# Patient Record
Sex: FEMALE | Race: BLACK OR AFRICAN AMERICAN | NOT HISPANIC OR LATINO | Employment: OTHER | URBAN - METROPOLITAN AREA
[De-identification: names, ages, dates, MRNs, and addresses within clinical notes are randomized per-mention and may not be internally consistent; named-entity substitution may affect disease eponyms.]

---

## 2023-02-23 ENCOUNTER — HOSPITAL ENCOUNTER (OUTPATIENT)
Dept: TELEMEDICINE | Facility: HOSPITAL | Age: 63
Discharge: HOME | End: 2023-02-23

## 2023-02-23 DIAGNOSIS — G93.40 ACUTE ENCEPHALOPATHY: ICD-10-CM

## 2023-02-23 PROCEDURE — G0426 INPT/ED TELECONSULT50: HCPCS | Mod: GT,,, | Performed by: STUDENT IN AN ORGANIZED HEALTH CARE EDUCATION/TRAINING PROGRAM

## 2023-02-23 PROCEDURE — G0426 PR INPT TELEHEALTH CONSULT 50M: ICD-10-PCS | Mod: GT,,, | Performed by: STUDENT IN AN ORGANIZED HEALTH CARE EDUCATION/TRAINING PROGRAM

## 2023-02-23 NOTE — SUBJECTIVE & OBJECTIVE
Woke up with symptoms?: yes    Recent bleeding noted: no  Does the patient take any Blood Thinners? no  Medications: No relevant medications      Past Medical History: hypertension, hyperlipidemia, MI/CAD and stroke    Past Surgical History: no relevant surgical history    Family History: no relevant history    Social History: no smoking, no drinking, no drugs    Allergies: Allergies have not been reviewed No relevant allergies    Review of Systems   Constitutional: Positive for activity change and fatigue.   HENT: Positive for voice change. Negative for trouble swallowing.    Eyes: Positive for visual disturbance.   Respiratory: Negative for shortness of breath.    Gastrointestinal: Negative for nausea and vomiting.   Endocrine: Negative.    Genitourinary: Negative.    Musculoskeletal: Positive for gait problem.   Skin: Negative.    Neurological: Positive for speech difficulty, weakness (generalized, RUE>LUE) and numbness. Negative for dizziness, facial asymmetry and headaches.   Psychiatric/Behavioral: Positive for behavioral problems, confusion and decreased concentration.     Objective:   Vitals: There were no vitals taken for this visit.     HR 72  /98  CT READ: Yes  No hemmorhage. No mass effect. No early infarct signs.        Physical Exam  Constitutional:       Appearance: She is ill-appearing and toxic-appearing.      Comments: Thin, poor cooperation   HENT:      Head: Normocephalic and atraumatic.   Eyes:      Extraocular Movements: Extraocular movements intact.   Cardiovascular:      Rate and Rhythm: Normal rate.   Pulmonary:      Effort: Pulmonary effort is normal.   Abdominal:      General: Abdomen is flat.   Musculoskeletal:      Cervical back: Normal range of motion.   Neurological:      Cranial Nerves: No cranial nerve deficit.      Sensory: Sensory deficit present.      Motor: Weakness present.      Comments: Unable to state the correct month  No obvious facial symmetry   RUE  antigravity w/ noxious stimuli and spontaneous movement noted - refuses to lift it up on exam   RLE drift   LLE/LUE moving spontaneously and on command   Sensory deficit in the RUE, but w/ tunicate applied for blood draw, no other sensory deficits   Dysarthria, but she is edentulous

## 2023-02-23 NOTE — ASSESSMENT & PLAN NOTE
62 yo female w/ PMHx of R PCA infarct, HTN, CAD s/p PCI, Arthritis who presents w/ reported R sided weakness and a fall the night prior.     She is a poor historian.   RUE, slurred speech and RFD as per EMS  LKW 20:00.   She is unable to state what time the deficits started or if they are chronic.   Denies AC use at home, states she is non-compliant.     NIHSS 9. Unclear what symptoms are new as she is a poor historian.   OOW for TNK.   CTH w/ chronic microvascular changes and old R PCA infarct as well as small lacunar infarcts b/l.     Unable to r/o any ischemia based on exam/history and patient w/ multiple risk factors and medication non compliance.   Recommend admission for MRI/MRA H/N at this time.     Recommend resuming Plavix and Statin.   If AIS is identified, additional ASA 81 mg QD x 30 days recommended.   Permissive HTNsion up to 220/110 until AIS is r/o w/ imaging.     Consider ECHO w/o bubble to assess cardiac function.   Recommend Neurology consultation.

## 2023-02-23 NOTE — HPI
64 yo female w/ PMHx of R PCA infarct, HTN, CAD s/p PCI, Arthritis who presents w/ reported R sided weakness and a fall the night prior.     She is a poor historian.   RUE, slurred speech and RFD as per EMS  LKW 20:00.   She is unable to state what time the deficits started or if they are chronic.   Denies AC use at home, states she is non-compliant.       HR 72  /98

## 2023-02-23 NOTE — CONSULTS
[facility]  Vascular Neurology  Comprehensive Stroke Center  Tele[facility]ascular Neurology Acute Consultation Note      Consults    Consulting Provider: HOWIE LOTT  Current Providers  No providers found    [facility]  Spoke hospital nurse at bedside with patient assisting consultant.     Patient information was obtained from patient and primary team.         Assessment/Plan:       Diagnoses:   Neuro  * Acute encephalopathy  64 yo female w/ PMHx of R PCA infarct, HTN, CAD s/p PCI, Arthritis who presents w/ reported R sided weakness and a fall the night prior.     She is a poor historian.   RUE, slurred speech and RFD as per EMS  LKW 20:00.   She is unable to state what time the deficits started or if they are chronic.   Denies AC use at home, states she is non-compliant.     NIHSS 9. Unclear what symptoms are new as she is a poor historian.   OOW for TNK.   CTH w/ chronic microvascular changes and old R PCA infarct as well as small lacunar infarcts b/l.     Unable to r/o any ischemia based on exam/history and patient w/ multiple risk factors and medication non compliance.   Recommend admission for MRI/MRA H/N at this time.     Recommend resuming Plavix and Statin.   If AIS is identified, additional ASA 81 mg QD x 30 days recommended.   Permissive HTNsion up to 220/110 until AIS is r/o w/ imaging.     Consider ECHO w/o bubble to assess cardiac function.   Recommend Neurology consultation.         STROKE DOCUMENTATION     Acute Stroke Times:   Acute Stroke Times   Last Known Normal Date: 02/22/23  Last Known Normal Time: 2030  Symptom Onset Date: 02/22/23  Symptom Onset Time: 2030  Stroke Team Called Date: 02/23/23  Stroke Team Called Time: 0136  Stroke Team Arrival Date: 02/23/23  Stroke Team Arrival Time: 0140  CT Interpretation Time: 0140  Thrombolytic Therapy Recommended:  No  CTA Interpretation Time:  (Recommended, pending )    NIH Scale:  1a. Level of Consciousness: 1-->Not alert, but arousable by minor stimulation to obey, answer, or respond  1b. LOC Questions: 1-->Answers one question correctly  1c. LOC Commands: 0-->Performs both tasks correctly  2. Best Gaze: 0-->Normal  3. Visual: 2-->Complete hemianopia  4. Facial Palsy: 0-->Normal symmetrical movements  5a. Motor Arm, Left: 1-->Drift, limb holds 90 (or 45) degrees, but drifts down before full 10 seconds, does not hit bed or other support  5b. Motor Arm, Right: 1-->Drift, limb holds 90 (or 45) degrees, but drifts down before full 10 secs, does not hit bed or other support  6a. Motor Leg, Left: 1-->Drift, leg falls by the end of the 5-sec period but does not hit bed  6b. Motor Leg, Right: 0-->No drift, leg holds 30 degree position for full 5 secs  7. Limb Ataxia: 0-->Absent  8. Sensory: 1-->Mild-to-moderate sensory loss, patient feels pinprick is less sharp or is dull on the affected side, or there is a loss of superficial pain with pinprick, but patient is aware of being touched  9. Best Language: 0-->No aphasia, normal  10. Dysarthria: 1-->Mild-to-moderate dysarthria, patient slurs at least some words and, at worst, can be understood with some difficulty  11. Extinction and Inattention (formerly Neglect): 0-->No abnormality  Total (NIH Stroke Scale): 9     Modified Wayne Score: 2  Bisi Coma Scale:    ABCD2 Score:    [id number] Score:   HAS -BLED Score:   ICH Score:   Hunt & Orr Classification:       There were no vitals taken for this visit.  Eligible for thrombolytic therapy?: No  Thrombolytic therapy recomended: Thrombolytic therapy not recommended due to Outside of treatment window  and Unknown/unclear onset   Possible Interventional Revascularization Candidate?  Awaiting vessel imaging     Disposition Recommendation: pending further studies  admit to inpatient    Subjective:     History of Present Illness:  64 yo  female w/ PMHx of R PCA infarct, HTN, CAD s/p PCI, Arthritis who presents w/ reported R sided weakness and a fall the night prior.     She is a poor historian.   RUE, slurred speech and RFD as per EMS  LKW 20:00.   She is unable to state what time the deficits started or if they are chronic.   Denies AC use at home, states she is non-compliant.       HR 72  /98          Woke up with symptoms?: yes    Recent bleeding noted: no  Does the patient take any Blood Thinners? no  Medications: No relevant medications      Past Medical History: hypertension, hyperlipidemia, MI/CAD and stroke    Past Surgical History: no relevant surgical history    Family History: no relevant history    Social History: no smoking, no drinking, no drugs    Allergies: Allergies have not been reviewed No relevant allergies    Review of Systems   Constitutional: Positive for activity change and fatigue.   HENT: Positive for voice change. Negative for trouble swallowing.    Eyes: Positive for visual disturbance.   Respiratory: Negative for shortness of breath.    Gastrointestinal: Negative for nausea and vomiting.   Endocrine: Negative.    Genitourinary: Negative.    Musculoskeletal: Positive for gait problem.   Skin: Negative.    Neurological: Positive for speech difficulty, weakness (generalized, RUE>LUE) and numbness. Negative for dizziness, facial asymmetry and headaches.   Psychiatric/Behavioral: Positive for behavioral problems, confusion and decreased concentration.     Objective:   Vitals: There were no vitals taken for this visit.     HR 72  /98  CT READ: Yes  No hemmorhage. No mass effect. No early infarct signs.        Physical Exam  Constitutional:       Appearance: She is ill-appearing and toxic-appearing.      Comments: Thin, poor cooperation   HENT:      Head: Normocephalic and atraumatic.   Eyes:      Extraocular Movements: Extraocular movements intact.   Cardiovascular:      Rate and Rhythm: Normal rate.    Pulmonary:      Effort: Pulmonary effort is normal.   Abdominal:      General: Abdomen is flat.   Musculoskeletal:      Cervical back: Normal range of motion.   Neurological:      Cranial Nerves: No cranial nerve deficit.      Sensory: Sensory deficit present.      Motor: Weakness present.      Comments: Unable to state the correct month  No obvious facial symmetry   RUE antigravity w/ noxious stimuli and spontaneous movement noted - refuses to lift it up on exam   RLE drift   LLE/LUE moving spontaneously and on command   Sensory deficit in the RUE, but w/ tunicate applied for blood draw, no other sensory deficits   Dysarthria, but she is edentulous               Recommended the emergency room physician to have a brief discussion with the patient and/or family if available regarding the  risks and benefits of treatment, and to briefly document the occurrence of that discussion in his clinical encounter note.     The attending portion of this evaluation, treatment, and documentation was performed per Mgean Martinez MD via audiovisual.    Billing code:  (moderate to severe stroke, large areas of edema, some mimics)      This patient has a critical neurological condition/illness, with high morbidity and mortality.  There is a high probability for acute neurological change leading to clinical and possibly life-threatening deterioration requiring highest level of physician preparedness for urgent intervention.  Care was coordinated with other physicians involved in the patient's care.  Radiologic studies and laboratory data were reviewed and interpreted, and plan of care was re-assessed based on the results.  Diagnosis, treatment options and prognosis may have been discussed with the patient and/or family members or caregiver.  Further advanced medical management and further evaluation is warranted for his care.    In your opinion, this was a: Tier 1 Van Negative    Consult End Time: 2:04 AM     Megan Martinez  MD  Comprehensive Stroke Center  Vascular Neurology   [facility]